# Patient Record
Sex: MALE | Race: WHITE | NOT HISPANIC OR LATINO | Employment: FULL TIME
[De-identification: names, ages, dates, MRNs, and addresses within clinical notes are randomized per-mention and may not be internally consistent; named-entity substitution may affect disease eponyms.]

---

## 2021-06-16 PROBLEM — G45.9 TIA (TRANSIENT ISCHEMIC ATTACK): Status: ACTIVE | Noted: 2019-03-13

## 2022-10-01 ENCOUNTER — HEALTH MAINTENANCE LETTER (OUTPATIENT)
Age: 57
End: 2022-10-01

## 2023-10-15 ENCOUNTER — HEALTH MAINTENANCE LETTER (OUTPATIENT)
Age: 58
End: 2023-10-15

## 2024-11-14 ENCOUNTER — APPOINTMENT (OUTPATIENT)
Dept: CT IMAGING | Facility: HOSPITAL | Age: 59
End: 2024-11-14
Attending: EMERGENCY MEDICINE
Payer: COMMERCIAL

## 2024-11-14 ENCOUNTER — HOSPITAL ENCOUNTER (EMERGENCY)
Facility: HOSPITAL | Age: 59
Discharge: HOME | End: 2024-11-14
Attending: EMERGENCY MEDICINE | Admitting: EMERGENCY MEDICINE
Payer: COMMERCIAL

## 2024-11-14 ENCOUNTER — HOSPITAL ENCOUNTER (EMERGENCY)
Facility: HOSPITAL | Age: 59
Discharge: LEFT BEFORE DISCHARGE | End: 2024-11-14
Payer: COMMERCIAL

## 2024-11-14 VITALS
TEMPERATURE: 98.5 F | HEIGHT: 65 IN | OXYGEN SATURATION: 97 % | DIASTOLIC BLOOD PRESSURE: 95 MMHG | SYSTOLIC BLOOD PRESSURE: 135 MMHG | RESPIRATION RATE: 16 BRPM | BODY MASS INDEX: 30.82 KG/M2 | WEIGHT: 185 LBS | HEART RATE: 84 BPM

## 2024-11-14 VITALS
RESPIRATION RATE: 16 BRPM | SYSTOLIC BLOOD PRESSURE: 134 MMHG | HEART RATE: 67 BPM | WEIGHT: 186 LBS | BODY MASS INDEX: 30.99 KG/M2 | HEIGHT: 65 IN | OXYGEN SATURATION: 98 % | TEMPERATURE: 98.3 F | DIASTOLIC BLOOD PRESSURE: 89 MMHG

## 2024-11-14 DIAGNOSIS — K57.92 ACUTE DIVERTICULITIS: ICD-10-CM

## 2024-11-14 LAB
ALBUMIN SERPL BCG-MCNC: 4 G/DL (ref 3.5–5.2)
ALBUMIN UR-MCNC: NEGATIVE MG/DL
ALP SERPL-CCNC: 312 U/L (ref 40–150)
ALT SERPL W P-5'-P-CCNC: 30 U/L (ref 0–70)
ANION GAP SERPL CALCULATED.3IONS-SCNC: 13 MMOL/L (ref 7–15)
APPEARANCE UR: CLEAR
AST SERPL W P-5'-P-CCNC: 33 U/L (ref 0–45)
BASOPHILS # BLD AUTO: 0 10E3/UL (ref 0–0.2)
BASOPHILS NFR BLD AUTO: 0 %
BILIRUB SERPL-MCNC: 0.3 MG/DL
BILIRUB UR QL STRIP: NEGATIVE
BUN SERPL-MCNC: 17.1 MG/DL (ref 8–23)
CALCIUM SERPL-MCNC: 9.3 MG/DL (ref 8.8–10.4)
CHLORIDE SERPL-SCNC: 103 MMOL/L (ref 98–107)
COLOR UR AUTO: ABNORMAL
CREAT SERPL-MCNC: 1.2 MG/DL (ref 0.67–1.17)
EGFRCR SERPLBLD CKD-EPI 2021: 69 ML/MIN/1.73M2
EOSINOPHIL # BLD AUTO: 0.1 10E3/UL (ref 0–0.7)
EOSINOPHIL NFR BLD AUTO: 1 %
ERYTHROCYTE [DISTWIDTH] IN BLOOD BY AUTOMATED COUNT: 13.9 % (ref 10–15)
GLUCOSE SERPL-MCNC: 106 MG/DL (ref 70–99)
GLUCOSE UR STRIP-MCNC: NEGATIVE MG/DL
HCO3 SERPL-SCNC: 23 MMOL/L (ref 22–29)
HCT VFR BLD AUTO: 46.2 % (ref 40–53)
HGB BLD-MCNC: 15 G/DL (ref 13.3–17.7)
HGB UR QL STRIP: NEGATIVE
IMM GRANULOCYTES # BLD: 0 10E3/UL
IMM GRANULOCYTES NFR BLD: 0 %
KETONES UR STRIP-MCNC: NEGATIVE MG/DL
LACTATE SERPL-SCNC: 0.9 MMOL/L (ref 0.7–2)
LEUKOCYTE ESTERASE UR QL STRIP: NEGATIVE
LIPASE SERPL-CCNC: 30 U/L (ref 13–60)
LYMPHOCYTES # BLD AUTO: 2.3 10E3/UL (ref 0.8–5.3)
LYMPHOCYTES NFR BLD AUTO: 26 %
MCH RBC QN AUTO: 28.4 PG (ref 26.5–33)
MCHC RBC AUTO-ENTMCNC: 32.5 G/DL (ref 31.5–36.5)
MCV RBC AUTO: 87 FL (ref 78–100)
MONOCYTES # BLD AUTO: 0.6 10E3/UL (ref 0–1.3)
MONOCYTES NFR BLD AUTO: 7 %
MUCOUS THREADS #/AREA URNS LPF: PRESENT /LPF
NEUTROPHILS # BLD AUTO: 5.8 10E3/UL (ref 1.6–8.3)
NEUTROPHILS NFR BLD AUTO: 66 %
NITRATE UR QL: NEGATIVE
NRBC # BLD AUTO: 0 10E3/UL
NRBC BLD AUTO-RTO: 0 /100
PH UR STRIP: 5.5 [PH] (ref 5–7)
PLATELET # BLD AUTO: 232 10E3/UL (ref 150–450)
POTASSIUM SERPL-SCNC: 4.5 MMOL/L (ref 3.4–5.3)
PROCALCITONIN SERPL IA-MCNC: 0.17 NG/ML
PROT SERPL-MCNC: 7.8 G/DL (ref 6.4–8.3)
RBC # BLD AUTO: 5.29 10E6/UL (ref 4.4–5.9)
RBC URINE: 0 /HPF
SODIUM SERPL-SCNC: 139 MMOL/L (ref 135–145)
SP GR UR STRIP: 1.01 (ref 1–1.03)
SQUAMOUS EPITHELIAL: <1 /HPF
UROBILINOGEN UR STRIP-MCNC: <2 MG/DL
WBC # BLD AUTO: 8.8 10E3/UL (ref 4–11)
WBC URINE: <1 /HPF

## 2024-11-14 PROCEDURE — 84145 PROCALCITONIN (PCT): CPT | Performed by: EMERGENCY MEDICINE

## 2024-11-14 PROCEDURE — 82310 ASSAY OF CALCIUM: CPT | Performed by: EMERGENCY MEDICINE

## 2024-11-14 PROCEDURE — 83605 ASSAY OF LACTIC ACID: CPT | Performed by: EMERGENCY MEDICINE

## 2024-11-14 PROCEDURE — 99281 EMR DPT VST MAYX REQ PHY/QHP: CPT

## 2024-11-14 PROCEDURE — 85025 COMPLETE CBC W/AUTO DIFF WBC: CPT | Performed by: EMERGENCY MEDICINE

## 2024-11-14 PROCEDURE — 82947 ASSAY GLUCOSE BLOOD QUANT: CPT | Performed by: EMERGENCY MEDICINE

## 2024-11-14 PROCEDURE — 250N000011 HC RX IP 250 OP 636: Performed by: EMERGENCY MEDICINE

## 2024-11-14 PROCEDURE — 87040 BLOOD CULTURE FOR BACTERIA: CPT | Performed by: EMERGENCY MEDICINE

## 2024-11-14 PROCEDURE — 96365 THER/PROPH/DIAG IV INF INIT: CPT | Mod: 59

## 2024-11-14 PROCEDURE — 82040 ASSAY OF SERUM ALBUMIN: CPT | Performed by: EMERGENCY MEDICINE

## 2024-11-14 PROCEDURE — 81001 URINALYSIS AUTO W/SCOPE: CPT | Performed by: EMERGENCY MEDICINE

## 2024-11-14 PROCEDURE — 74177 CT ABD & PELVIS W/CONTRAST: CPT

## 2024-11-14 PROCEDURE — 99291 CRITICAL CARE FIRST HOUR: CPT | Mod: 25

## 2024-11-14 PROCEDURE — 99292 CRITICAL CARE ADDL 30 MIN: CPT

## 2024-11-14 PROCEDURE — 36415 COLL VENOUS BLD VENIPUNCTURE: CPT | Performed by: EMERGENCY MEDICINE

## 2024-11-14 PROCEDURE — 83690 ASSAY OF LIPASE: CPT | Performed by: EMERGENCY MEDICINE

## 2024-11-14 RX ORDER — PIPERACILLIN SODIUM, TAZOBACTAM SODIUM 3; .375 G/15ML; G/15ML
3.38 INJECTION, POWDER, LYOPHILIZED, FOR SOLUTION INTRAVENOUS ONCE
Status: COMPLETED | OUTPATIENT
Start: 2024-11-14 | End: 2024-11-14

## 2024-11-14 RX ORDER — IOPAMIDOL 755 MG/ML
90 INJECTION, SOLUTION INTRAVASCULAR ONCE
Status: COMPLETED | OUTPATIENT
Start: 2024-11-14 | End: 2024-11-14

## 2024-11-14 RX ADMIN — IOPAMIDOL 90 ML: 755 INJECTION, SOLUTION INTRAVENOUS at 18:08

## 2024-11-14 RX ADMIN — PIPERACILLIN AND TAZOBACTAM 3.38 G: 3; .375 INJECTION, POWDER, FOR SOLUTION INTRAVENOUS at 19:47

## 2024-11-14 ASSESSMENT — ACTIVITIES OF DAILY LIVING (ADL)
ADLS_ACUITY_SCORE: 0

## 2024-11-14 ASSESSMENT — COLUMBIA-SUICIDE SEVERITY RATING SCALE - C-SSRS
6. HAVE YOU EVER DONE ANYTHING, STARTED TO DO ANYTHING, OR PREPARED TO DO ANYTHING TO END YOUR LIFE?: NO
2. HAVE YOU ACTUALLY HAD ANY THOUGHTS OF KILLING YOURSELF IN THE PAST MONTH?: NO
1. IN THE PAST MONTH, HAVE YOU WISHED YOU WERE DEAD OR WISHED YOU COULD GO TO SLEEP AND NOT WAKE UP?: NO
6. HAVE YOU EVER DONE ANYTHING, STARTED TO DO ANYTHING, OR PREPARED TO DO ANYTHING TO END YOUR LIFE?: NO
2. HAVE YOU ACTUALLY HAD ANY THOUGHTS OF KILLING YOURSELF IN THE PAST MONTH?: NO
1. IN THE PAST MONTH, HAVE YOU WISHED YOU WERE DEAD OR WISHED YOU COULD GO TO SLEEP AND NOT WAKE UP?: NO

## 2024-11-14 NOTE — ED NOTES
Bed: JNED-18  Expected date: 11/14/24  Expected time: 12:00 AM  Means of arrival: Walked  Comments:

## 2024-11-14 NOTE — ED TRIAGE NOTES
Patient presents here for evaluation of a fever and lower abdominal. Temp at his oncology clinic was 102.7. He did not take any antipyretics prior to arrival. He has rectal cancer and began chemotherapy two weeks ago as well as radiation therapy.      Triage Assessment (Adult)       Row Name 11/14/24 1439          Triage Assessment    Airway WDL WDL        Respiratory WDL    Respiratory WDL WDL        Skin Circulation/Temperature WDL    Skin Circulation/Temperature WDL WDL        Cardiac WDL    Cardiac WDL WDL        Peripheral/Neurovascular WDL    Peripheral Neurovascular WDL WDL        Cognitive/Neuro/Behavioral WDL    Cognitive/Neuro/Behavioral WDL WDL

## 2024-11-14 NOTE — ED NOTES
Expected Patient Referral to ED  2:01 PM    Referring Clinic/Provider:  [facility]    Reason for referral/Clinical facts:  Patient with a history of rectal cancer on radiation treatment and chemotherapy including Avastin.  Had acute onset abdominal pain last evening.  In clinic today has low-grade temperature to 100.2, continuing to have some abdominal discomfort.  Concern is for possible perforation as patient would be at increased risk for this the current regimen.    Recommendations provided:  Send to ED for further evaluation      Julian Lopez MD  [facility] EMERGENCY DEPARTMENT  [address] [place] [id number] [phone]       Julian Lopez MD  11/14/24 0838

## 2024-11-14 NOTE — ED PROVIDER NOTES
EMERGENCY DEPARTMENT ENCOUNTER      NAME: Manny Lei  AGE: 58 year old male  YOB: 1965  MRN: [id number]  EVALUATION DATE & TIME: No admission date for patient encounter.    PCP: No primary care provider on file.    ED PROVIDER: Julian Lopez M.D.      Chief Complaint   Patient presents with    Abdominal Pain    Fever         FINAL IMPRESSION:  1. Acute diverticulitis          ED COURSE & MEDICAL DECISION MAKIN year old male presents to the Emergency Department for evaluation of abdominal pain.  Patient with a history of rectal cancer undergoing chemotherapy and radiation treatment.  Presenting with acute left lower quadrant abdominal pain that started last night.  Concern was highest for possible bowel perforation during the referral  call from oncology.  Patient arrives to the emergency department vitally stable.  Apparently had a fever earlier in the day but has defervesced at this time.  He has stable vital signs otherwise.  Currently not reporting significant pain, rated 1 out of 10 in severity.  He has a reassuring exam although does have tenderness to deep palpation of the left lower quadrant.  He underwent lab and imaging evaluation as below.  CT shows signs of sigmoid diverticulitis without any complication like abscess or perforation.  The remainder of his CT looks stable without any acute findings to account for fever.  The rest of his evaluation looking for sepsis shows a stable white blood cell count with no neutropenia, stable metabolic labs, negative lactic acid level.  Patient was given a dose of IV Zosyn.  I discussed options including potentially hospitalization given his immunocompromise state and infection with fever.  after discussion of options, patient currently is resting comfortably and would prefer to go home if feasible.  I think this is reasonable at this point given the stability of his exam, vital signs, labs.  We discussed the treatment course of  Augmentin and continued follow-up with primary/oncology.  Patient discharged in stable condition.    At the conclusion of the encounter I discussed the results of all of the tests and the disposition. The questions were answered. The patient or family acknowledged understanding and was agreeable with the care plan.     4:00 PM I met with the patient, obtained history, performed an initial exam, and discussed options and plan for diagnostics and treatment here in the ED.     Medical Decision Making  Obtained supplemental history:Supplemental history obtained?: No  Reviewed external records: External records reviewed?: No  Care impacted by chronic illness:Documented in Chart  Did you consider but not order tests?: Work up considered but not performed and documented in chart, if applicable  Did you interpret images independently?: Independent interpretation of ECG and images noted in documentation, when applicable.  Consultation discussion with other provider:Did you involve another provider (consultant, , pharmacy, etc.)?: I discussed the care with another health care provider, see documentation for details.  Discharge. I prescribed additional prescription strength medication(s) as charted. I considered admission, but discharged the patient after share decision making conversation.    MIPS: Not Applicable          MEDICATIONS GIVEN IN THE EMERGENCY:  Medications   iopamidol (ISOVUE-370) solution 90 mL (90 mLs Intravenous $Given 11/14/24 1808)   piperacillin-tazobactam (ZOSYN) 3.375 g vial to attach to  mL bag (0 g Intravenous Stopped 11/14/24 2003)       NEW PRESCRIPTIONS STARTED AT TODAY'S ER VISIT  New Prescriptions    AMOXICILLIN-CLAVULANATE (AUGMENTIN) 875-125 MG TABLET    Take 1 tablet by mouth 2 times daily for 10 days.          =================================================================    HPI    Patient information was obtained from: patient     Use of : N/A       Manny Lei is  a 58 year old male with a pertinent history of HLD, HTN, metastatic malignant neoplasm to liver, who presents to this ED by walk in for evaluation of abdominal pain and fever.     The patient reports that his abdominal pain started yesterday at 4 PM, after waking up from a quick nap. He said he felt fine prior to his nap. His pain is located in the left lower quadrant and it does not radiate. Today, he still has abdominal pain, but the pain is less severe. He noted that he developed a fever today as well.     Patient denies cough, congestion, urinary discomfort or stool changes.       REVIEW OF SYSTEMS   All systems reviewed and negative except as noted in HPI.    PAST MEDICAL HISTORY:  No past medical history on file.    PAST SURGICAL HISTORY:  No past surgical history on file.        CURRENT MEDICATIONS:    No current facility-administered medications for this encounter.     Current Outpatient Medications   Medication Sig Dispense Refill    amoxicillin-clavulanate (AUGMENTIN) 875-125 MG tablet Take 1 tablet by mouth 2 times daily for 10 days. 20 tablet 0         ALLERGIES:  Allergies   Allergen Reactions    Lisinopril Angioedema       FAMILY HISTORY:  No family history on file.    SOCIAL HISTORY:   Social History     Socioeconomic History    Marital status:    Tobacco Use    Smoking status: Never    Smokeless tobacco: Never   Substance and Sexual Activity    Alcohol use: Yes     Alcohol/week: 2.0 standard drinks of alcohol    Drug use: No     Social Drivers of Health     Financial Resource Strain: Low Risk  (2/9/2023)    [facility]    Financial Resource Strain     Difficulty of Paying Living Expenses: 3   Food Insecurity: No Food Insecurity (2/9/2023)    [facility]    Food Insecurity     Worried About Running Out of Food in the Last Year: 1   Transportation Needs: No Transportation Needs (2/9/2023)    Received  [facility]from [facility]    Transportation Needs     Lack of Transportation (Medical): 1   Social Connections: Socially Integrated (2/9/2023)    Received from [facility]    Social Connections     Frequency of Communication with Friends and Family: 0   Housing Stability: Low Risk  (2/9/2023)    Received from [facility]    Housing Stability     Unable to Pay for Housing in the Last Year: 1       VITALS:  /89   Pulse 67   Temp 98.3  F (36.8  C) (Oral)   Resp 16   Ht 1.651 m (5' 5\")   Wt 84.4 kg (186 lb)   SpO2 98%   BMI 30.95 kg/m      PHYSICAL EXAM    Constitutional: Well developed, Well nourished, NAD.  HENT: Normocephalic, Atraumatic. Neck Supple.  Eyes: EOMI, Conjunctiva normal.  Respiratory: Breathing comfortably on room air. Speaks full sentences easily. Lungs clear to ascultation.  Cardiovascular: Normal heart rate, Regular rhythm. No peripheral edema.  Abdomen: Soft, tenderness to deep palpation isolated to the left lower quadrant.  Musculoskeletal: Good range of motion in all major joints. No major deformities noted.  Integument: Warm, Dry.  Neurologic: Alert & awake, Normal motor function, Normal sensory function, No focal deficits noted.   Psychiatric: Cooperative. Affect appropriate.     LAB:  All pertinent labs reviewed and interpreted.  Labs Ordered and Resulted from Time of ED Arrival to Time of ED Departure   COMPREHENSIVE METABOLIC PANEL - Abnormal       Result Value    Sodium 139      Potassium 4.5      Carbon Dioxide (CO2) 23      Anion Gap 13      Urea Nitrogen 17.1      Creatinine 1.20 (*)     GFR Estimate 69      Calcium 9.3      Chloride 103      Glucose 106 (*)     Alkaline Phosphatase 312 (*)     AST 33      ALT 30      Protein Total 7.8      Albumin 4.0      Bilirubin Total 0.3     ROUTINE UA WITH MICROSCOPIC REFLEX TO CULTURE - Abnormal    Color Urine Light Yellow      Appearance Urine " Clear      Glucose Urine Negative      Bilirubin Urine Negative      Ketones Urine Negative      Specific Gravity Urine 1.012      Blood Urine Negative      pH Urine 5.5      Protein Albumin Urine Negative      Urobilinogen Urine <2.0      Nitrite Urine Negative      Leukocyte Esterase Urine Negative      Mucus Urine Present (*)     RBC Urine 0      WBC Urine <1      Squamous Epithelials Urine <1     LIPASE - Normal    Lipase 30     LACTIC ACID WHOLE BLOOD WITH 1X REPEAT IN 2 HR WHEN >2 - Normal    Lactic Acid, Initial 0.9     PROCALCITONIN - Normal    Procalcitonin 0.17     CBC WITH PLATELETS AND DIFFERENTIAL    WBC Count 8.8      RBC Count 5.29      Hemoglobin 15.0      Hematocrit 46.2      MCV 87      MCH 28.4      MCHC 32.5      RDW 13.9      Platelet Count 232      % Neutrophils 66      % Lymphocytes 26      % Monocytes 7      % Eosinophils 1      % Basophils 0      % Immature Granulocytes 0      NRBCs per 100 WBC 0      Absolute Neutrophils 5.8      Absolute Lymphocytes 2.3      Absolute Monocytes 0.6      Absolute Eosinophils 0.1      Absolute Basophils 0.0      Absolute Immature Granulocytes 0.0      Absolute NRBCs 0.0     BLOOD CULTURE       RADIOLOGY:  Reviewed all pertinent imaging. Please see official radiology report.  CT Abdomen Pelvis w Contrast   Final Result   IMPRESSION:    1.  Left lower quadrant sigmoid diverticulitis without fluid collections.   2.  Overall increase in size of pulmonary and hepatic metastatic disease.   3.  Lytic inferior left pubic ramus bony metastasis, now with pathologic fracture.            I, Emi Horn am serving as a scribe to document services personally performed by Dr. Julian Lopez, based on my observation and the provider's statements to me. I, Julian Lopez MD attest that Emi Horn is acting in a scribe capacity, has observed my performance of the services and has documented them in accordance with my direction.    Julian Lopez M.D.  Emergency  [facility] EMERGENCY DEPARTMENT  [address] [place] [id number]  [phone]  Dept: [phone]       Julian Lopez MD  11/14/24 2015

## 2024-11-14 NOTE — ED TRIAGE NOTES
Pt reports onset of LLQ abdominal pain yesterday afternoon. Reports hx of colorectal cancer and is currently getting radiation therapy.  Pt had radiation yesterday last. Denies nausea, emesis, diarrhea or constipation at this time.

## 2024-11-15 NOTE — DISCHARGE INSTRUCTIONS
You were seen in the emergency department for abdominal pain and fever.  Your evaluation included a CT scan.  This showed signs of diverticulitis.  This is an infection that develops due to outpouchings in the colon that can get obstructed from time to time.  We are not sure if this directly relates to your cancer at all.  As we discussed the rest of your lab evaluations look stable.  You can continue on antibiotics for the next 10 days and we can monitor things with outpatient follow-up.  If you do have high fever and weakness, intractable vomiting, severe increase in pain not managed with Tylenol and oxycodone, you should return to the hospital right away to continue your evaluation.  Please try to follow-up with your primary/oncologist after this ED visit as soon as possible.

## 2024-11-19 LAB — BACTERIA BLD CULT: NO GROWTH

## 2024-12-08 ENCOUNTER — HEALTH MAINTENANCE LETTER (OUTPATIENT)
Age: 59
End: 2024-12-08